# Patient Record
Sex: MALE
[De-identification: names, ages, dates, MRNs, and addresses within clinical notes are randomized per-mention and may not be internally consistent; named-entity substitution may affect disease eponyms.]

---

## 2019-05-08 PROBLEM — Z00.00 ENCOUNTER FOR PREVENTIVE HEALTH EXAMINATION: Status: ACTIVE | Noted: 2019-05-08

## 2019-05-14 ENCOUNTER — APPOINTMENT (OUTPATIENT)
Dept: UROLOGY | Facility: CLINIC | Age: 34
End: 2019-05-14
Payer: COMMERCIAL

## 2019-05-14 VITALS
DIASTOLIC BLOOD PRESSURE: 71 MMHG | TEMPERATURE: 98.7 F | BODY MASS INDEX: 24.38 KG/M2 | WEIGHT: 190 LBS | HEART RATE: 59 BPM | HEIGHT: 74 IN | SYSTOLIC BLOOD PRESSURE: 115 MMHG

## 2019-05-14 DIAGNOSIS — Z30.09 ENCOUNTER FOR OTHER GENERAL COUNSELING AND ADVICE ON CONTRACEPTION: ICD-10-CM

## 2019-05-14 PROCEDURE — 99204 OFFICE O/P NEW MOD 45 MIN: CPT

## 2019-05-14 RX ORDER — VIT D3-VIT K/BERBERINE/HOPS 500-500-90
TABLET ORAL
Refills: 0 | Status: ACTIVE | COMMUNITY

## 2019-05-14 RX ORDER — ACETAMINOPHEN AND CODEINE 300; 30 MG/1; MG/1
300-30 TABLET ORAL
Qty: 10 | Refills: 0 | Status: ACTIVE | COMMUNITY
Start: 2019-05-14 | End: 1900-01-01

## 2019-05-14 RX ORDER — FINASTERIDE 1 MG/1
TABLET ORAL
Refills: 0 | Status: ACTIVE | COMMUNITY

## 2019-05-14 RX ORDER — CEPHALEXIN 500 MG/1
500 CAPSULE ORAL 3 TIMES DAILY
Qty: 3 | Refills: 0 | Status: ACTIVE | COMMUNITY
Start: 2019-05-14 | End: 1900-01-01

## 2019-05-14 NOTE — HISTORY OF PRESENT ILLNESS
[FreeTextEntry1] : 33M  to 34F 2 children aged 4 and 2 years old. here to discuss vasectomy. currnetly wife has IUD in place. no voiding cmoplaitns. no hematuria. no dysruia. no fevers chills. no nausea vomting. no nocturia. no difficulty with conception. no fmaily history prostate kidney bladder cancer.

## 2019-05-14 NOTE — PHYSICAL EXAM
[General Appearance - Well Developed] : well developed [General Appearance - Well Nourished] : well nourished [Normal Appearance] : normal appearance [Well Groomed] : well groomed [Heart Rate And Rhythm] : Heart rate and rhythm were normal [Abdomen Soft] : soft [] : no respiratory distress [Abdomen Tenderness] : non-tender [Costovertebral Angle Tenderness] : no ~M costovertebral angle tenderness [Abdomen Hernia] : no hernia was discovered [Penis Abnormality] : normal uncircumcised penis [Urinary Bladder Findings] : the bladder was normal on palpation [Urethral Meatus] : meatus normal [Testes Tenderness] : no tenderness of the testes [Epididymis] : the epididymides were normal [Scrotum] : the scrotum was normal [Normal Station and Gait] : the gait and station were normal for the patient's age [Testes Mass (___cm)] : there were no testicular masses [No Focal Deficits] : no focal deficits [Skin Color & Pigmentation] : normal skin color and pigmentation [Oriented To Time, Place, And Person] : oriented to person, place, and time [No Palpable Adenopathy] : no palpable adenopathy [FreeTextEntry1] : easily palpable vasa bialaterally

## 2019-05-14 NOTE — ASSESSMENT
[FreeTextEntry1] : vasectomy evaluation\par Options for contraception, including oral contraceptive pills, condoms, intrauterine device, tubal ligation among others, were discussed at length today.\par \par He understands that vasectomy is considered a permanent procedure although it can be reversed with a complex microsurgical vasectomy reversal.  In addition, postprocedure pain was discussed as was a very rare failure rate of vasectomy. Risk of hematoma formation and very rare injury to the testicular artery was discussed. Infection risk was discussed as well.   He understands that he is still considered fertile until a semen analysis demonstrates azoospermia. He also understands that he needs to continue using contraception until azoospermia is verified following his vasectomy. The 30 day waiting period mandated by [facility] was discussed as well.\par \par will schedule\par interested in cryopreservation preop

## 2019-07-12 ENCOUNTER — APPOINTMENT (OUTPATIENT)
Dept: UROLOGY | Facility: CLINIC | Age: 34
End: 2019-07-12
Payer: COMMERCIAL

## 2019-07-12 VITALS — DIASTOLIC BLOOD PRESSURE: 74 MMHG | TEMPERATURE: 98.4 F | HEART RATE: 71 BPM | SYSTOLIC BLOOD PRESSURE: 109 MMHG

## 2019-07-12 PROCEDURE — 55250 REMOVAL OF SPERM DUCT(S): CPT

## 2019-07-15 RX ORDER — SULFAMETHOXAZOLE AND TRIMETHOPRIM 800; 160 MG/1; MG/1
800-160 TABLET ORAL TWICE DAILY
Qty: 14 | Refills: 0 | Status: ACTIVE | COMMUNITY
Start: 2019-07-15 | End: 1900-01-01